# Patient Record
Sex: FEMALE | NOT HISPANIC OR LATINO | Employment: FULL TIME | URBAN - METROPOLITAN AREA
[De-identification: names, ages, dates, MRNs, and addresses within clinical notes are randomized per-mention and may not be internally consistent; named-entity substitution may affect disease eponyms.]

---

## 2017-02-10 ENCOUNTER — HOSPITAL ENCOUNTER (EMERGENCY)
Facility: HOSPITAL | Age: 37
Discharge: HOME | End: 2017-02-10
Attending: EMERGENCY MEDICINE | Admitting: EMERGENCY MEDICINE

## 2017-02-10 ENCOUNTER — APPOINTMENT (OUTPATIENT)
Dept: CARDIOLOGY | Facility: HOSPITAL | Age: 37
End: 2017-02-10

## 2017-02-10 VITALS
HEIGHT: 62 IN | RESPIRATION RATE: 16 BRPM | BODY MASS INDEX: 26.68 KG/M2 | OXYGEN SATURATION: 100 % | TEMPERATURE: 98 F | HEART RATE: 75 BPM | SYSTOLIC BLOOD PRESSURE: 115 MMHG | DIASTOLIC BLOOD PRESSURE: 81 MMHG | WEIGHT: 145 LBS

## 2017-02-10 DIAGNOSIS — R55 SYNCOPE AND COLLAPSE: Primary | ICD-10-CM

## 2017-02-10 LAB
ALBUMIN SERPL-MCNC: 4.2 G/DL (ref 3.5–5.2)
ALBUMIN/GLOB SERPL: 1.6 G/DL
ALP SERPL-CCNC: 49 U/L (ref 39–117)
ALT SERPL W P-5'-P-CCNC: 18 U/L (ref 1–33)
AMPHET+METHAMPHET UR QL: NEGATIVE
ANION GAP SERPL CALCULATED.3IONS-SCNC: 13.8 MMOL/L
AST SERPL-CCNC: 18 U/L (ref 1–32)
B-HCG UR QL: NEGATIVE
BARBITURATES UR QL SCN: NEGATIVE
BASOPHILS # BLD AUTO: 0.02 10*3/MM3 (ref 0–0.2)
BASOPHILS NFR BLD AUTO: 0.2 % (ref 0–1.5)
BENZODIAZ UR QL SCN: NEGATIVE
BILIRUB SERPL-MCNC: 0.2 MG/DL (ref 0.1–1.2)
BILIRUB UR QL STRIP: NEGATIVE
BUN BLD-MCNC: 12 MG/DL (ref 6–20)
BUN/CREAT SERPL: 16.9 (ref 7–25)
CALCIUM SPEC-SCNC: 9.1 MG/DL (ref 8.6–10.5)
CANNABINOIDS SERPL QL: NEGATIVE
CHLORIDE SERPL-SCNC: 100 MMOL/L (ref 98–107)
CLARITY UR: CLEAR
CO2 SERPL-SCNC: 23.2 MMOL/L (ref 22–29)
COCAINE UR QL: NEGATIVE
COLOR UR: YELLOW
CREAT BLD-MCNC: 0.71 MG/DL (ref 0.57–1)
DEPRECATED RDW RBC AUTO: 38.8 FL (ref 37–54)
EOSINOPHIL # BLD AUTO: 0.06 10*3/MM3 (ref 0–0.7)
EOSINOPHIL NFR BLD AUTO: 0.6 % (ref 0.3–6.2)
ERYTHROCYTE [DISTWIDTH] IN BLOOD BY AUTOMATED COUNT: 11.7 % (ref 11.7–13)
ETHANOL BLD-MCNC: <10 MG/DL (ref 0–10)
ETHANOL UR QL: <0.01 %
GFR SERPL CREATININE-BSD FRML MDRD: 113 ML/MIN/1.73
GFR SERPL CREATININE-BSD FRML MDRD: 93 ML/MIN/1.73
GLOBULIN UR ELPH-MCNC: 2.7 GM/DL
GLUCOSE BLD-MCNC: 96 MG/DL (ref 65–99)
GLUCOSE BLDC GLUCOMTR-MCNC: 77 MG/DL (ref 70–130)
GLUCOSE UR STRIP-MCNC: NEGATIVE MG/DL
HCG SERPL QL: NEGATIVE
HCT VFR BLD AUTO: 38.3 % (ref 35.6–45.5)
HGB BLD-MCNC: 12.6 G/DL (ref 11.9–15.5)
HGB UR QL STRIP.AUTO: NEGATIVE
HOLD SPECIMEN: NORMAL
HOLD SPECIMEN: NORMAL
IMM GRANULOCYTES # BLD: 0.03 10*3/MM3 (ref 0–0.03)
IMM GRANULOCYTES NFR BLD: 0.3 % (ref 0–0.5)
KETONES UR QL STRIP: NEGATIVE
LEUKOCYTE ESTERASE UR QL STRIP.AUTO: NEGATIVE
LYMPHOCYTES # BLD AUTO: 2.15 10*3/MM3 (ref 0.9–4.8)
LYMPHOCYTES NFR BLD AUTO: 21.2 % (ref 19.6–45.3)
MAGNESIUM SERPL-MCNC: 1.9 MG/DL (ref 1.6–2.6)
MCH RBC QN AUTO: 29.9 PG (ref 26.9–32)
MCHC RBC AUTO-ENTMCNC: 32.9 G/DL (ref 32.4–36.3)
MCV RBC AUTO: 90.8 FL (ref 80.5–98.2)
METHADONE UR QL SCN: NEGATIVE
MONOCYTES # BLD AUTO: 0.33 10*3/MM3 (ref 0.2–1.2)
MONOCYTES NFR BLD AUTO: 3.3 % (ref 5–12)
NEUTROPHILS # BLD AUTO: 7.53 10*3/MM3 (ref 1.9–8.1)
NEUTROPHILS NFR BLD AUTO: 74.4 % (ref 42.7–76)
NITRITE UR QL STRIP: NEGATIVE
OPIATES UR QL: NEGATIVE
OXYCODONE UR QL SCN: NEGATIVE
PH UR STRIP.AUTO: 6 [PH] (ref 5–8)
PLATELET # BLD AUTO: 352 10*3/MM3 (ref 140–500)
PMV BLD AUTO: 10 FL (ref 6–12)
POTASSIUM BLD-SCNC: 4.3 MMOL/L (ref 3.5–5.2)
PROT SERPL-MCNC: 6.9 G/DL (ref 6–8.5)
PROT UR QL STRIP: NEGATIVE
RBC # BLD AUTO: 4.22 10*6/MM3 (ref 3.9–5.2)
SODIUM BLD-SCNC: 137 MMOL/L (ref 136–145)
SP GR UR STRIP: 1.01 (ref 1–1.03)
TROPONIN T SERPL-MCNC: <0.01 NG/ML (ref 0–0.03)
UROBILINOGEN UR QL STRIP: NORMAL
WBC NRBC COR # BLD: 10.12 10*3/MM3 (ref 4.5–10.7)
WHOLE BLOOD HOLD SPECIMEN: NORMAL
WHOLE BLOOD HOLD SPECIMEN: NORMAL

## 2017-02-10 PROCEDURE — 93010 ELECTROCARDIOGRAM REPORT: CPT | Performed by: INTERNAL MEDICINE

## 2017-02-10 PROCEDURE — 85025 COMPLETE CBC W/AUTO DIFF WBC: CPT | Performed by: NURSE PRACTITIONER

## 2017-02-10 PROCEDURE — 80307 DRUG TEST PRSMV CHEM ANLYZR: CPT | Performed by: NURSE PRACTITIONER

## 2017-02-10 PROCEDURE — 84703 CHORIONIC GONADOTROPIN ASSAY: CPT | Performed by: EMERGENCY MEDICINE

## 2017-02-10 PROCEDURE — 81025 URINE PREGNANCY TEST: CPT | Performed by: NURSE PRACTITIONER

## 2017-02-10 PROCEDURE — 0296T HC EXT ECG > 48HR TO 21 DAY RCRD W/CONECT INTL RCRD: CPT

## 2017-02-10 PROCEDURE — 80053 COMPREHEN METABOLIC PANEL: CPT | Performed by: NURSE PRACTITIONER

## 2017-02-10 PROCEDURE — 81003 URINALYSIS AUTO W/O SCOPE: CPT | Performed by: NURSE PRACTITIONER

## 2017-02-10 PROCEDURE — 82962 GLUCOSE BLOOD TEST: CPT

## 2017-02-10 PROCEDURE — 84484 ASSAY OF TROPONIN QUANT: CPT | Performed by: EMERGENCY MEDICINE

## 2017-02-10 PROCEDURE — 83735 ASSAY OF MAGNESIUM: CPT | Performed by: EMERGENCY MEDICINE

## 2017-02-10 PROCEDURE — 93005 ELECTROCARDIOGRAM TRACING: CPT

## 2017-02-10 PROCEDURE — 99283 EMERGENCY DEPT VISIT LOW MDM: CPT

## 2017-02-10 RX ORDER — SODIUM CHLORIDE 0.9 % (FLUSH) 0.9 %
10 SYRINGE (ML) INJECTION AS NEEDED
Status: DISCONTINUED | OUTPATIENT
Start: 2017-02-10 | End: 2017-02-10 | Stop reason: HOSPADM

## 2017-02-10 NOTE — DISCHARGE INSTRUCTIONS
Pt instructions:  Rest  Follow up with Primary Care Doctor for further management and to have your blood pressure rechecked.   Return to ER with pain, swelling, numbness/tingling, fever, chills, weakness, nausea, vomiting, diarrhea, abdominal pain, back pain, urinary concerns, chest pain, shortness of breath, dizziness, headache, worsening of symptoms or other concerns.

## 2017-02-10 NOTE — ED NOTES
"Pt states \"i just don't feel good. i fainted last night and again today.\"      Perla Agosto RN  02/10/17 0901    "

## 2017-02-10 NOTE — ED PROVIDER NOTES
Pt presents to the ED complaining of two syncopal episode, which occurred once last night and this morning. Pt states that she developed tingling in her sinuses and light headedness prior to the syncopal episode and was only unconscious for several seconds. Pt's labs are unremarkable. On exam, the pt's heart is RRR w/o murmur, lungs are CTAB, neck is supple with no adenopathy, oropharynx is clear, abd is soft, non-distended and non-tender with NABS and NVID. I agree with the plan to discharge the pt home.       EKG           EKG time:  0921  Rhythm/Rate: NSR, 80  P waves and IL: normal  QRS, axis: normal   ST and T waves: normal     Interpreted Contemporaneously by me, independently viewed  improved compared to prior on 4-3-01    I supervised care provided by the midlevel provider.    We have discussed this patient's history, physical exam, and treatment plan.   I have reviewed the note and personally saw and examined the patient and agree with the plan of care.    Documentation assistance provided by feroz Piper for Dr. Bates.  Information recorded by the feroz was done at my direction and has been verified and validated by me.       Rina Piper  02/10/17 1220       Andi Bates MD  02/10/17 [id number]

## 2017-02-10 NOTE — ED NOTES
When pt had syncopal last night, she was sitting in a chair. Prior to event, pt felt tingling in her nose so she sat down, and then  Had her episode. Pt fell out of chair and hit head. Today, was sitting in car and had the same feeling, but also was pale, diaphoretic, and nauseated.     Perla Agosto RN  02/10/17 9235

## 2017-02-10 NOTE — ED PROVIDER NOTES
EMERGENCY DEPARTMENT ENCOUNTER    CHIEF COMPLAINT  Chief Complaint: syncope   History given by: patient   History limited by: nothing   Room Number: 08/08  PMD: No Known Provider    HPI  Pt is a 37 y.o. female who presents with a syncopal episode that occurred last night while pt was at work. Pt states she first experienced tingling in her upper nose and frontal sinus area. She then began to feel lightheaded, nauseated, and diaphoretic before losing consciousness. Her coworkers state that she was unconscious for a few seconds. Pt reports that she began feeling similar sx this morning in the car; however, she did not fully lose consciousness at that time. Pt also complains of feeling mildly SOA as if she constantly needs to take a deep breath, cough, and congestion. She states that she has sinus issues and allergies. Pt is also under a great deal of stress due to her job and the recent passing of her father. Pt denies fever, earache, chest pain, decreased appetite, and any other sx at this time. Pt does not smoke, has one cup of coffee a day, and only uses EtOH occasionally.     Duration: brief, occurred yesterday at work   Timing: constant   Location: generalized   Radiation: does not radiate   Quality: new   Intensity/Severity: moderate   Progression: unchanged as pt had a similar episode this morning   Associated Symptoms: tingling, lightheadedness, nausea, diaphoresis, SOA, cough, congestion  Aggravating Factors: none specified   Alleviating Factors: none specified   Previous Episodes: yesterday and this morning   Treatment before arrival: none specified     PAST MEDICAL HISTORY  Active Ambulatory Problems     Diagnosis Date Noted   • No Active Ambulatory Problems     Resolved Ambulatory Problems     Diagnosis Date Noted   • No Resolved Ambulatory Problems     No Additional Past Medical History       PAST SURGICAL HISTORY  Past Surgical History   Procedure Laterality Date   • External ear surgery     • Breast  surgery         FAMILY HISTORY  History reviewed. No pertinent family history.    SOCIAL HISTORY  Social History     Social History   • Marital status: Unknown     Spouse name: N/A   • Number of children: N/A   • Years of education: N/A     Occupational History   • Not on file.     Social History Main Topics   • Smoking status: Never Smoker   • Smokeless tobacco: Not on file   • Alcohol use No   • Drug use: No   • Sexual activity: Defer     Other Topics Concern   • Not on file     Social History Narrative   • No narrative on file         ALLERGIES  Review of patient's allergies indicates no known allergies.    REVIEW OF SYSTEMS  Review of Systems   Constitutional: Positive for diaphoresis. Negative for fever.   HENT: Positive for congestion. Negative for sore throat.    Eyes: Negative.    Respiratory: Positive for cough and shortness of breath.    Cardiovascular: Negative for chest pain.   Gastrointestinal: Negative for abdominal pain, diarrhea and vomiting.   Genitourinary: Negative for dysuria.   Musculoskeletal: Negative for neck pain.   Skin: Negative for rash.   Allergic/Immunologic: Negative.    Neurological: Positive for syncope, light-headedness and numbness (tingling in nose/frontal sinus area). Negative for weakness and headaches.   Hematological: Negative.    Psychiatric/Behavioral: Negative.    All other systems reviewed and are negative.      PHYSICAL EXAM  ED Triage Vitals   Temp Heart Rate Resp BP SpO2   02/10/17 0902 02/10/17 0902 02/10/17 0902 02/10/17 0908 02/10/17 0902   98.2 °F (36.8 °C) 109 16 122/81 99 %      Temp src Heart Rate Source Patient Position BP Location FiO2 (%)   02/10/17 0902 -- 02/10/17 0909 -- --   Tympanic  Lying         Physical Exam   Constitutional: She is well-developed, well-nourished, and in no distress. No distress.   HENT:   Head: Normocephalic and atraumatic.   Right Ear: Tympanic membrane and ear canal normal.   Left Ear: Tympanic membrane and ear canal normal.   Nose:  Nose normal. Right sinus exhibits no maxillary sinus tenderness and no frontal sinus tenderness. Left sinus exhibits no maxillary sinus tenderness and no frontal sinus tenderness.   Mouth/Throat: Oropharynx is clear and moist and mucous membranes are normal.   Eyes: Pupils are equal, round, and reactive to light.   Neck: Normal range of motion.   Cardiovascular: Normal rate, regular rhythm and normal heart sounds.    Pulmonary/Chest: Effort normal and breath sounds normal. She has no wheezes.   Abdominal: Soft. Bowel sounds are normal. There is no tenderness.   Musculoskeletal: Normal range of motion. She exhibits no edema.   Neurological: She is alert.   Skin: Skin is warm and dry. No rash noted.   Psychiatric: Mood, memory, affect and judgment normal.   Nursing note and vitals reviewed.      LAB RESULTS  Recent Results (from the past 24 hour(s))   POC Glucose Fingerstick    Collection Time: 02/10/17  9:18 AM   Result Value Ref Range    Glucose 77 70 - 130 mg/dL   Magnesium    Collection Time: 02/10/17  9:46 AM   Result Value Ref Range    Magnesium 1.9 1.6 - 2.6 mg/dL   Troponin    Collection Time: 02/10/17  9:46 AM   Result Value Ref Range    Troponin T <0.010 0.000 - 0.030 ng/mL   hCG, Serum, Qualitative    Collection Time: 02/10/17  9:46 AM   Result Value Ref Range    HCG Qualitative Negative Indeterminate, Negative   Comprehensive Metabolic Panel    Collection Time: 02/10/17  9:46 AM   Result Value Ref Range    Glucose 96 65 - 99 mg/dL    BUN 12 6 - 20 mg/dL    Creatinine 0.71 0.57 - 1.00 mg/dL    Sodium 137 136 - 145 mmol/L    Potassium 4.3 3.5 - 5.2 mmol/L    Chloride 100 98 - 107 mmol/L    CO2 23.2 22.0 - 29.0 mmol/L    Calcium 9.1 8.6 - 10.5 mg/dL    Total Protein 6.9 6.0 - 8.5 g/dL    Albumin 4.20 3.50 - 5.20 g/dL    ALT (SGPT) 18 1 - 33 U/L    AST (SGOT) 18 1 - 32 U/L    Alkaline Phosphatase 49 39 - 117 U/L    Total Bilirubin 0.2 0.1 - 1.2 mg/dL    eGFR Non African Amer 93 >60 mL/min/1.73    eGFR  African  Amer 113 >60 mL/min/1.73    Globulin 2.7 gm/dL    A/G Ratio 1.6 g/dL    BUN/Creatinine Ratio 16.9 7.0 - 25.0    Anion Gap 13.8 mmol/L   Ethanol    Collection Time: 02/10/17  9:46 AM   Result Value Ref Range    Ethanol <10 0 - 10 mg/dL    Ethanol % <0.010 %   CBC Auto Differential    Collection Time: 02/10/17  9:46 AM   Result Value Ref Range    WBC 10.12 4.50 - 10.70 10*3/mm3    RBC 4.22 3.90 - 5.20 10*6/mm3    Hemoglobin 12.6 11.9 - 15.5 g/dL    Hematocrit 38.3 35.6 - 45.5 %    MCV 90.8 80.5 - 98.2 fL    MCH 29.9 26.9 - 32.0 pg    MCHC 32.9 32.4 - 36.3 g/dL    RDW 11.7 11.7 - 13.0 %    RDW-SD 38.8 37.0 - 54.0 fl    MPV 10.0 6.0 - 12.0 fL    Platelets 352 140 - 500 10*3/mm3    Neutrophil % 74.4 42.7 - 76.0 %    Lymphocyte % 21.2 19.6 - 45.3 %    Monocyte % 3.3 (L) 5.0 - 12.0 %    Eosinophil % 0.6 0.3 - 6.2 %    Basophil % 0.2 0.0 - 1.5 %    Immature Grans % 0.3 0.0 - 0.5 %    Neutrophils, Absolute 7.53 1.90 - 8.10 10*3/mm3    Lymphocytes, Absolute 2.15 0.90 - 4.80 10*3/mm3    Monocytes, Absolute 0.33 0.20 - 1.20 10*3/mm3    Eosinophils, Absolute 0.06 0.00 - 0.70 10*3/mm3    Basophils, Absolute 0.02 0.00 - 0.20 10*3/mm3    Immature Grans, Absolute 0.03 0.00 - 0.03 10*3/mm3   Pregnancy, Urine    Collection Time: 02/10/17  9:49 AM   Result Value Ref Range    HCG, Urine QL Negative Negative   Urinalysis With / Culture If Indicated    Collection Time: 02/10/17  9:49 AM   Result Value Ref Range    Color, UA Yellow Yellow, Straw    Appearance, UA Clear Clear    pH, UA 6.0 5.0 - 8.0    Specific Gravity, UA 1.012 1.005 - 1.030    Glucose, UA Negative Negative    Ketones, UA Negative Negative    Bilirubin, UA Negative Negative    Blood, UA Negative Negative    Protein, UA Negative Negative    Leuk Esterase, UA Negative Negative    Nitrite, UA Negative Negative    Urobilinogen, UA 0.2 E.U./dL 0.2 - 1.0 E.U./dL   Urine Drug Screen    Collection Time: 02/10/17  9:49 AM   Result Value Ref Range    Amphet/Methamphet, Screen  "Negative Negative    Barbiturates Screen, Urine Negative Negative    Benzodiazepine Screen, Urine Negative Negative    Cocaine Screen, Urine Negative Negative    Opiate Screen Negative Negative    THC, Screen, Urine Negative Negative    Methadone Screen, Urine Negative Negative    Oxycodone Screen, Urine Negative Negative       I ordered the above labs and reviewed the results.    EKG    ekg was interpreted by Dr. Bates.     PROGRESS AND CONSULTS    1133: Reviewed pt's history and workup with Dr. Bates.  After a bedside evaluation; Dr. Bates agrees with the plan of care.    1143: Rechecked pt. Pt is resting comfortably. Discussed unremarkable workup and plan for discharge with Holter monitor to observe heart rhythm. Pt understands and agrees with plan and all questions were addressed.     COURSE & MEDICAL DECISION MAKING  Pertinent Labs and Imaging studies that were ordered and reviewed are noted above.  Results were reviewed/discussed with the patient and they were also made aware of online assess.   Pt also made aware that some labs, such as cultures, will not be resulted during ER visit and follow up with PMD is necessary.     MEDICATIONS GIVEN IN ER  Medications   sodium chloride 0.9 % flush 10 mL (not administered)       Visit Vitals   • /81 (BP Location: Right arm, Patient Position: Lying)   • Pulse 75   • Temp 98 °F (36.7 °C)   • Resp 16   • Ht 62\" (157.5 cm)   • Wt 145 lb (65.8 kg)   • SpO2 100%   • BMI 26.52 kg/m2     Discussed all results and noted any abnormalities with patient.  Discussed absoute need to recheck abnormalities with PCP.    Reviewed implications of results, diagnosis, meds, responsibility to follow up, warning signs and symptoms of possible worsening, potential complications and reasons to return to ER with patient    Discussed plan for discharge, as there is no emergent indication for admission.  Pt is agreeable and understands need for follow up and repeat testing.  Pt is aware " that discharge does not mean that nothing is wrong but it indicates no emergency is present and they must continue care with PCP.  Pt is discharged with instructions to follow up with primary care doctor to have their blood pressure rechecked.       DIAGNOSIS  Final diagnoses:   Syncope and collapse       FOLLOW UP   Rossi Pérez MD  [address] [id number] [phone]    Schedule an appointment as soon as possible for a visit      RX     Medication List      Notice     No changes were made to your prescriptions during this visit.        I personally reviewed the past medical history, past surgical history, social history, family history, current medications and allergies as they appear in this chart.  The scribe's note accurately reflects the work and decisions made by me.       I personally scribed for Priscilla Smith on 2/10/2017 at 1:25 PM.  Electronically signed by Diana Huynh on 2/10/2017 at time 1:25 PM;             Diana Huynh  02/10/17 1239       Priscilla Smith, CELI  02/10/17 1327

## 2017-03-11 PROCEDURE — 0298T ZIO PATCH: CPT | Performed by: INTERNAL MEDICINE

## 2017-03-13 ENCOUNTER — OFFICE VISIT (OUTPATIENT)
Dept: CARDIOLOGY | Facility: CLINIC | Age: 37
End: 2017-03-13

## 2017-03-13 VITALS
WEIGHT: 144 LBS | SYSTOLIC BLOOD PRESSURE: 138 MMHG | BODY MASS INDEX: 26.5 KG/M2 | DIASTOLIC BLOOD PRESSURE: 70 MMHG | HEIGHT: 62 IN | HEART RATE: 89 BPM

## 2017-03-13 DIAGNOSIS — I45.5 SINUS PAUSE: ICD-10-CM

## 2017-03-13 DIAGNOSIS — Z91.09 ENVIRONMENTAL ALLERGIES: ICD-10-CM

## 2017-03-13 DIAGNOSIS — R06.02 SHORTNESS OF BREATH: ICD-10-CM

## 2017-03-13 DIAGNOSIS — R55 SYNCOPE, UNSPECIFIED SYNCOPE TYPE: Primary | ICD-10-CM

## 2017-03-13 PROCEDURE — 93000 ELECTROCARDIOGRAM COMPLETE: CPT | Performed by: NURSE PRACTITIONER

## 2017-03-13 PROCEDURE — 99203 OFFICE O/P NEW LOW 30 MIN: CPT | Performed by: NURSE PRACTITIONER

## 2017-03-13 RX ORDER — NORETHINDRONE ACETATE AND ETHINYL ESTRADIOL AND FERROUS FUMARATE 1MG-20(21)
KIT ORAL
COMMUNITY
Start: 2017-03-12 | End: 2022-04-25 | Stop reason: HOSPADM

## 2017-03-13 RX ORDER — FLUTICASONE PROPIONATE 50 MCG
SPRAY, SUSPENSION (ML) NASAL
Refills: 4 | COMMUNITY
Start: 2017-01-26 | End: 2022-04-22

## 2017-03-13 NOTE — PATIENT INSTRUCTIONS
Avoid sinus medications    Keep yourself well hydrated     Research vasovagal syncope    Syncope    Syncope is when you temporarily lose consciousness. Syncope may also be called fainting or passing out. It is caused by a sudden decrease in blood flow to the brain. Even though most causes of syncope are not dangerous, syncope can be a sign of a serious medical problem. Signs that you may be about to faint include:  · Feeling dizzy or light-headed.  · Feeling nauseous.  · Seeing all white or all black in your field of vision.  · Having cold, clammy skin.  If you fainted, get medical help right away. Call your local emergency services (911 in the [place] Do not drive yourself to the hospital.  HOME CARE INSTRUCTIONS  Pay attention to any changes in your symptoms. Take these actions to help with your condition:  · Have someone stay with you until you feel stable.  · Do not drive, use machinery, or play sports until your health care provider says it is okay.  · Keep all follow-up visits as told by your health care provider. This is important.  · If you start to feel like you might faint, lie down right away and raise (elevate) your feet above the level of your heart. Breathe deeply and steadily. Wait until all of the symptoms have passed.  · Drink enough fluid to keep your urine clear or pale yellow.  · If you are taking blood pressure or heart medicine, get up slowly and take several minutes to sit and then stand. This can reduce dizziness.  · Take over-the-counter and prescription medicines only as told by your health care provider.  SEEK IMMEDIATE MEDICAL CARE IF:  · You have a severe headache.  · You have unusual pain in your chest, abdomen, or back.  · You are bleeding from your mouth or rectum, or you have black or tarry stool.  · You have a very fast or irregular heartbeat (palpitations).  · You have pain with breathing.  · You faint once or repeatedly.  · You have a seizure.  · You are confused.  · You have  trouble walking.  · You have severe weakness.  · You have vision problems.  These symptoms may represent a serious problem that is an emergency. Do not wait to see if your symptoms will go away. Get medical help right away. Call your local emergency services (911 in the [place] Do not drive yourself to the hospital.     This information is not intended to replace advice given to you by your health care provider. Make sure you discuss any questions you have with your health care provider.     Document Released: 12/18/2006 Document Revised: 11/28/2016 Document Reviewed: 08/31/2016  [employer] Interactive Patient Education ©2016 [employer]

## 2017-03-13 NOTE — PROGRESS NOTES
Date of Office Visit: 2017  Encounter Provider: CELI Gomez  Place of Service: [facility] CARDIOLOGY  Patient Name: Ying Rankin  :1980    Chief Complaint   Patient presents with   • Loss of Consciousness   :     HPI: Ying Rankin is a 37 y.o. female who presents today for evaluation of syncope. She had two episodes of syncope two years ago while sitting. In 2017, she was at work sitting in a chair. She felt a tingling in her nose and then had lightheadedness. She then passed out and fell out of the chair.  She did not seek medical attention.  The following day she was seen in her car and had the same symptoms of tingling in her nose and lightheadedness.  She then passed out once again while sitting.  When she came to she relates that she had passed out and felt weak and diaphoretic.  She denied any chest pain, or palpitations.  She did present to the [facility] emergency department for further evaluation.    Upon review of the ED records from 2/10/2017, her blood pressure was 122/81 and heart rate 109.  Glucose, magnesium, troponin, pregnancy test, CMP, ethanol level, CBC and urinalysis were all normal.  The patient was recommended to contact our office to make an appointment and she did not do so.  She was placed on a 14 day Zio patch monitor.    The Zio patch monitor was read by Dr. Yasmani Zamora yesterday.  The underlying rhythm was sinus with an average heart rate of 91, minimum 37 and maximum 152.  There were 2 pauses noted during the tracing with the longest being 4.2 seconds at nighttime.  The patient did not report any symptoms.  She was recommended to follow-up in our office today.    The patient presents a for follow-up.  She denies any further syncopal episodes.  She does experience shortness of breath with exertion on occasion.  She denies chest pain, paroxysmal nocturnal dyspnea, orthopnea, cough, palpitations, or edema.    Past  Medical History   Diagnosis Date   • Ankle injury 8/13/2015   • Contusion of finger 8/13/2015     Overview:  2015 IMO UPDATE   • Leukocytosis 8/13/2015   • Syncope        Past Surgical History   Procedure Laterality Date   • External ear surgery     • Breast surgery         Social History     Social History   • Marital status: Single     Spouse name: N/A   • Number of children: N/A   • Years of education: N/A     Occupational History   • Not on file.     Social History Main Topics   • Smoking status: Never Smoker   • Smokeless tobacco: Not on file   • Alcohol use No   • Drug use: No   • Sexual activity: Defer     Other Topics Concern   • Not on file     Social History Narrative       Family History   Problem Relation Age of Onset   • Heart disease Paternal Aunt    • Heart disease Paternal Uncle    • Heart disease Paternal Grandmother        Review of Systems   Constitution: Negative for chills, diaphoresis, fever, malaise/fatigue, night sweats, weight gain and weight loss.   HENT: Negative for hearing loss, nosebleeds, sore throat and tinnitus.    Eyes: Negative for blurred vision, double vision, pain and visual disturbance.   Cardiovascular: Positive for dyspnea on exertion and syncope. Negative for chest pain, claudication, cyanosis, irregular heartbeat, leg swelling, near-syncope, orthopnea, palpitations and paroxysmal nocturnal dyspnea.   Respiratory: Positive for snoring. Negative for cough, hemoptysis and wheezing.    Endocrine: Negative for cold intolerance, heat intolerance and polyuria.   Hematologic/Lymphatic: Negative for bleeding problem. Does not bruise/bleed easily.   Skin: Negative for color change, dry skin, flushing and itching.   Musculoskeletal: Negative for falls, joint pain, joint swelling, muscle cramps, muscle weakness and myalgias.   Gastrointestinal: Negative for abdominal pain, constipation, heartburn, melena, nausea and vomiting.   Genitourinary: Negative for dysuria and hematuria.  "  Neurological: Negative for excessive daytime sleepiness, dizziness, light-headedness, loss of balance, numbness, paresthesias, seizures and vertigo.   Psychiatric/Behavioral: Negative for altered mental status, depression, memory loss and substance abuse. The patient is nervous/anxious. The patient does not have insomnia.    Allergic/Immunologic: Negative for environmental allergies.       No Known Allergies      Current Outpatient Prescriptions:   •  fluticasone (FLONASE) 50 MCG/ACT nasal spray, USE 2 SPRAYS IEN QD, Disp: , Rfl: 4  •  MICROGESTIN FE 1/20 1-20 MG-MCG per tablet, , Disp: , Rfl:      Objective:     Vitals:    03/13/17 1059   BP: 138/70   BP Location: Left arm   Pulse: 89   Weight: 144 lb (65.3 kg)   Height: 62\" (157.5 cm)     Body mass index is 26.34 kg/(m^2).    PHYSICAL EXAM:    Vitals Reviewed.   General Appearance: No acute distress, well developed and well nourished.   Eyes: Conjunctiva and lids: No erythema, swelling, or discharge. Sclera non-icteric.   HENT: Atraumatic, normocephalic. External eyes, ears, and nose normal. No hearing loss noted. Mucous membranes normal. Lips not cyanotic. Neck supple with no tenderness.  Respiratory: No signs of respiratory distress. Respiration rhythm and depth normal.   Clear to auscultation. No rales, crackles, rhonchi, or wheezing auscultated.   Cardiovascular:  Jugular Venous Pressure: Normal  Heart Rate and Rhythm: Normal, Heart Sounds: Normal S1 and S2. No S3 or S4 noted.  Murmurs: No murmurs noted. No rubs, thrills, or gallops.   Arterial Pulses: Carotid pulses normal. No carotid bruit noted. Posterior tibialis and dorsalis pedis pulses normal.   Lower Extremities: No edema noted.  Gastrointestinal:  Abdomen soft, non-distended, non-tender. Normal bowel sounds. No hepatomegaly.   Musculoskeletal: Normal movement of extremities  Skin and Nails: General appearance normal. No pallor, cyanosis, diaphoresis. Skin temperature normal. No clubbing of " fingernails.   Psychiatric: Patient alert and oriented to person, place, and time. Speech and behavior appropriate. Normal mood and affect.       ECG 12 Lead  Date/Time: 3/13/2017 10:03 AM  Performed by: ANNIE RAGSDALE  Authorized by: ANNIE RAGSDALE   Comparison: compared with previous ECG from 2/10/2017  Similar to previous ECG  Rhythm: sinus rhythm  Rate: normal  BPM: 89  Conduction: conduction normal  ST Segments: ST segments normal  T Waves: T waves normal  QRS axis: normal  Other: no other findings  Clinical impression: normal ECG              Assessment:       Diagnosis Plan   1. Syncope, unspecified syncope type  Adult Transthoracic Echo Complete   2. Sinus pause  Adult Transthoracic Echo Complete   3. Shortness of breath  Adult Transthoracic Echo Complete          Plan:       The patient presents a for evaluation of syncope and review of her 14 day Zio patch monitor. Dr. Dima Urena also saw the patient with me in the office.  The patient describes her previous 2 syncopal episodes and the most recent two syncopal episodes for Dr. Urena.  He also reviewed her Zio patch monitor which showed 2 episodes of pauses with the longest 4.2 seconds in duration.  These episodes occurred during sleeping hours.  Dr. Urena feels the patient had a vasovagal syncopal episode.  He has recommended that she had a 2-D echocardiogram to assess her structural heart.  If that is normal, he feels these sinus pauses were incidental finding and can be normal for her age during the sleeping hours.  The next time she experiences the prodrome symptoms, he is recommended that she medially get to the floor and lie down.  He has also asked that the patient keep herself well-hydrated and avoid sinus medications.  The patient will be establishing care with a new PCP in the near future and I have asked her to discuss treatment of her allergies and sinus infections with her PCP.    As always, it has been a pleasure to participate in  your patient's care.      Sincerely,         CELI Canchola

## 2017-03-16 ENCOUNTER — HOSPITAL ENCOUNTER (OUTPATIENT)
Dept: CARDIOLOGY | Facility: HOSPITAL | Age: 37
Discharge: HOME | End: 2017-03-16

## 2017-03-16 DIAGNOSIS — R06.02 SHORTNESS OF BREATH: ICD-10-CM

## 2017-03-16 DIAGNOSIS — I45.5 SINUS PAUSE: ICD-10-CM

## 2017-03-16 DIAGNOSIS — R55 SYNCOPE, UNSPECIFIED SYNCOPE TYPE: ICD-10-CM

## 2017-03-21 ENCOUNTER — TELEPHONE (OUTPATIENT)
Dept: CARDIOLOGY | Facility: CLINIC | Age: 37
End: 2017-03-21

## 2017-03-21 NOTE — TELEPHONE ENCOUNTER
I spoke with patient. She did not have the 2-D echocardiogram completed as previously recommended because of the financial cost.  I recommended that she follow-up with Dr. Zamora as previously scheduled on 3/30/17 and she can discuss with him at that time.  He will also further evaluate her with her recent syncopal episodes.    The patient states that she does not want to have the echocardiogram completed, nor follow up with Dr. Zamora.  She wants to call if she has any further episodes of syncope.  If she changes her mind, I've asked her to contact the office.

## 2017-03-21 NOTE — TELEPHONE ENCOUNTER
----- Message from CELI Shaver sent at 3/17/2017  1:07 PM EDT -----    Patient did not have the echocardiogram completed     ----- Message -----     From: CELI Shaver     Sent: 3/13/2017  11:51 AM       To: CELI Shaver      Follow up on echo 3/16/17

## 2022-01-05 ENCOUNTER — APPOINTMENT (OUTPATIENT)
Dept: WOMENS IMAGING | Facility: HOSPITAL | Age: 42
End: 2022-01-05

## 2022-01-05 PROCEDURE — 77063 BREAST TOMOSYNTHESIS BI: CPT | Performed by: RADIOLOGY

## 2022-01-05 PROCEDURE — 77067 SCR MAMMO BI INCL CAD: CPT | Performed by: RADIOLOGY

## 2022-04-22 ENCOUNTER — PRE-ADMISSION TESTING (OUTPATIENT)
Dept: PREADMISSION TESTING | Facility: HOSPITAL | Age: 42
End: 2022-04-22
Payer: COMMERCIAL

## 2022-04-22 VITALS
TEMPERATURE: 98.4 F | RESPIRATION RATE: 16 BRPM | BODY MASS INDEX: 29.02 KG/M2 | DIASTOLIC BLOOD PRESSURE: 82 MMHG | OXYGEN SATURATION: 99 % | WEIGHT: 157.7 LBS | HEART RATE: 92 BPM | SYSTOLIC BLOOD PRESSURE: 122 MMHG | HEIGHT: 62 IN

## 2022-04-22 LAB
ANION GAP SERPL CALCULATED.3IONS-SCNC: 9.8 MMOL/L (ref 5–15)
BASOPHILS # BLD AUTO: 0.05 10*3/MM3 (ref 0–0.2)
BASOPHILS NFR BLD AUTO: 0.7 % (ref 0–1.5)
BUN SERPL-MCNC: 11 MG/DL (ref 6–20)
BUN/CREAT SERPL: 14.3 (ref 7–25)
CALCIUM SPEC-SCNC: 9.1 MG/DL (ref 8.6–10.5)
CHLORIDE SERPL-SCNC: 104 MMOL/L (ref 98–107)
CO2 SERPL-SCNC: 23.2 MMOL/L (ref 22–29)
CREAT SERPL-MCNC: 0.77 MG/DL (ref 0.57–1)
DEPRECATED RDW RBC AUTO: 41.3 FL (ref 37–54)
EGFRCR SERPLBLD CKD-EPI 2021: 99.5 ML/MIN/1.73
EOSINOPHIL # BLD AUTO: 0.05 10*3/MM3 (ref 0–0.4)
EOSINOPHIL NFR BLD AUTO: 0.7 % (ref 0.3–6.2)
ERYTHROCYTE [DISTWIDTH] IN BLOOD BY AUTOMATED COUNT: 11.9 % (ref 12.3–15.4)
GLUCOSE SERPL-MCNC: 86 MG/DL (ref 65–99)
HCG SERPL QL: NEGATIVE
HCT VFR BLD AUTO: 39.9 % (ref 34–46.6)
HGB BLD-MCNC: 12.6 G/DL (ref 12–15.9)
LYMPHOCYTES # BLD AUTO: 2.3 10*3/MM3 (ref 0.7–3.1)
LYMPHOCYTES NFR BLD AUTO: 30.5 % (ref 19.6–45.3)
MCH RBC QN AUTO: 29.6 PG (ref 26.6–33)
MCHC RBC AUTO-ENTMCNC: 31.6 G/DL (ref 31.5–35.7)
MCV RBC AUTO: 93.7 FL (ref 79–97)
MONOCYTES # BLD AUTO: 0.36 10*3/MM3 (ref 0.1–0.9)
MONOCYTES NFR BLD AUTO: 4.8 % (ref 5–12)
NEUTROPHILS NFR BLD AUTO: 4.74 10*3/MM3 (ref 1.7–7)
NEUTROPHILS NFR BLD AUTO: 62.9 % (ref 42.7–76)
PLATELET # BLD AUTO: 389 10*3/MM3 (ref 140–450)
PMV BLD AUTO: 9.8 FL (ref 6–12)
POTASSIUM SERPL-SCNC: 4.1 MMOL/L (ref 3.5–5.2)
RBC # BLD AUTO: 4.26 10*6/MM3 (ref 3.77–5.28)
SARS-COV-2 ORF1AB RESP QL NAA+PROBE: NOT DETECTED
SODIUM SERPL-SCNC: 137 MMOL/L (ref 136–145)
WBC NRBC COR # BLD: 7.53 10*3/MM3 (ref 3.4–10.8)

## 2022-04-22 PROCEDURE — 80048 BASIC METABOLIC PNL TOTAL CA: CPT

## 2022-04-22 PROCEDURE — U0004 COV-19 TEST NON-CDC HGH THRU: HCPCS

## 2022-04-22 PROCEDURE — 36415 COLL VENOUS BLD VENIPUNCTURE: CPT

## 2022-04-22 PROCEDURE — C9803 HOPD COVID-19 SPEC COLLECT: HCPCS

## 2022-04-22 PROCEDURE — 84703 CHORIONIC GONADOTROPIN ASSAY: CPT

## 2022-04-22 PROCEDURE — 85027 COMPLETE CBC AUTOMATED: CPT

## 2022-04-22 NOTE — DISCHARGE INSTRUCTIONS
Take the following medications the morning of surgery: Microgestin    Arrival time: 830am   Cut off time: 730am      General Instructions:  Do not eat solid food after midnight the night before surgery.  You may drink clear liquids day of surgery but must stop at least one hour before your hospital arrival time.  It is beneficial for you to have a clear drink that contains carbohydrates the day of surgery.  We suggest a 12 to 20 ounce bottle of Gatorade or Powerade for non-diabetic patients or a 12 to 20 ounce bottle of G2 or Powerade Zero for diabetic patients. (Pediatric patients, are not advised to drink a 12 to 20 ounce carbohydrate drink)    Clear liquids are liquids you can see through.  Nothing red in color.     Plain water                               Sports drinks  Sodas                                   Gelatin (Jell-O)  Fruit juices without pulp such as white grape juice and apple juice  Popsicles that contain no fruit or yogurt  Tea or coffee (no cream or milk added)  Gatorade / Powerade  G2 / Powerade Zero     Patients who avoid smoking, chewing tobacco and alcohol for 4 weeks prior to surgery have a reduced risk of post-operative complications.  Quit smoking as many days before surgery as you can.  Do not smoke, use chewing tobacco or drink alcohol the day of surgery.   Bring any papers given to you in the doctor’s office.  Wear clean comfortable clothes.  Do not wear contact lenses, false eyelashes or make-up.  Bring a case for your glasses.   Remove all piercings.  Leave jewelry and any other valuables at home.  Hair extensions with metal clips must be removed prior to surgery.  The Pre-Admission Testing nurse will instruct you to bring medications if unable to obtain an accurate list in Pre-Admission Testing.    Preventing a Surgical Site Infection:  For 2 to 3 days before surgery, avoid shaving with a razor because the razor can irritate skin and make it easier to develop an infection.    Any  areas of open skin can increase the risk of a post-operative wound infection by allowing bacteria to enter and travel throughout the body.  Notify your surgeon if you have any skin wounds / rashes even if it is not near the expected surgical site.  The area will need assessed to determine if surgery should be delayed until it is healed.  The night prior to surgery shower using a fresh bar of anti-bacterial soap (such as Dial) and clean washcloth.  Sleep in a clean bed with clean clothing.  Do not allow pets to sleep with you.  Shower on the morning of surgery using a fresh bar of anti-bacterial soap (such as Dial) and clean washcloth.  Dry with a clean towel and dress in clean clothing.  Ask your surgeon if you will be receiving antibiotics prior to surgery.  Make sure you, your family, and all healthcare providers clean their hands with soap and water or an alcohol based hand  before caring for you or your wound.    Day of surgery:  Your arrival time is approximately two hours before your scheduled surgery time.  Upon arrival, a Pre-op nurse and Anesthesiologist will review your health history, obtain vital signs, and answer questions you may have.  The only belongings needed at this time will be a list of your home medications and if applicable your C-PAP/BI-PAP machine.  A Pre-op nurse will start an IV and you may receive medication in preparation for surgery, including something to help you relax.     Please be aware that surgery does come with discomfort.  We want to make every effort to control your discomfort so please discuss any uncontrolled symptoms with your nurse.   Your doctor will most likely have prescribed pain medications.      If you are going home after surgery you will receive individualized written care instructions before being discharged.  A responsible adult must drive you to and from the hospital on the day of your surgery and stay with you for 24 hours.  Discharge prescriptions can  be filled by the hospital pharmacy during regular pharmacy hours.  If you are having surgery late in the day/evening your prescription may be e-prescribed to your pharmacy.  Please verify your pharmacy hours or chose a 24 hour pharmacy to avoid not having access to your prescription because your pharmacy has closed for the day.    If you are staying overnight following surgery, you will be transported to your hospital room following the recovery period.  [facility] has all private rooms.    If you have any questions please call Pre-Admission Testing at [phone].  Deductibles and co-payments are collected on the day of service. Please be prepared to pay the required co-pay, deductible or deposit on the day of service as defined by your plan.    Patient Education for Self-Quarantine Process    Following your COVID testing, we strongly recommend that you wear a mask when you are with other people and practice social distancing.   Limit your activities to only required outings.  Wash your hands with soap and water frequently for at least 20 seconds.   Avoid touching your eyes, nose and mouth with unwashed hands.  Do not share anything - utensils, drinking glasses, food from the same bowl.   Sanitize household surfaces daily. Include all high touch areas (door handles, light switches, phones, countertops, etc.)    Call your surgeon immediately if you experience any of the following symptoms:  Sore Throat  Shortness of Breath or difficulty breathing  Cough  Chills  Body soreness or muscle pain  Headache  Fever  New loss of taste or smell  Do not arrive for your surgery ill.  Your procedure will need to be rescheduled to another time.  You will need to call your physician before the day of surgery to avoid any unnecessary exposure to hospital staff as well as other patients.

## 2022-04-25 ENCOUNTER — ANESTHESIA EVENT (OUTPATIENT)
Dept: PERIOP | Facility: HOSPITAL | Age: 42
End: 2022-04-25
Payer: COMMERCIAL

## 2022-04-25 ENCOUNTER — HOSPITAL ENCOUNTER (OUTPATIENT)
Facility: HOSPITAL | Age: 42
Setting detail: HOSPITAL OUTPATIENT SURGERY
Discharge: HOME | End: 2022-04-25
Attending: OBSTETRICS & GYNECOLOGY | Admitting: OBSTETRICS & GYNECOLOGY
Payer: COMMERCIAL

## 2022-04-25 ENCOUNTER — ANESTHESIA (OUTPATIENT)
Dept: PERIOP | Facility: HOSPITAL | Age: 42
End: 2022-04-25
Payer: COMMERCIAL

## 2022-04-25 VITALS
DIASTOLIC BLOOD PRESSURE: 78 MMHG | TEMPERATURE: 97.7 F | RESPIRATION RATE: 16 BRPM | HEART RATE: 84 BPM | OXYGEN SATURATION: 97 % | SYSTOLIC BLOOD PRESSURE: 116 MMHG

## 2022-04-25 DIAGNOSIS — Z30.2 ENCOUNTER FOR STERILIZATION: Primary | ICD-10-CM

## 2022-04-25 DIAGNOSIS — Z30.2 STERILIZATION: ICD-10-CM

## 2022-04-25 PROBLEM — Z30.9 CONTRACEPTION MANAGEMENT: Status: ACTIVE | Noted: 2022-04-25

## 2022-04-25 PROCEDURE — 25010000002 ROPIVACAINE PER 1 MG: Performed by: OBSTETRICS & GYNECOLOGY

## 2022-04-25 PROCEDURE — 88302 TISSUE EXAM BY PATHOLOGIST: CPT | Performed by: OBSTETRICS & GYNECOLOGY

## 2022-04-25 PROCEDURE — 25010000002 FENTANYL CITRATE (PF) 50 MCG/ML SOLUTION: Performed by: NURSE ANESTHETIST, CERTIFIED REGISTERED

## 2022-04-25 PROCEDURE — 25010000002 MIDAZOLAM PER 1 MG: Performed by: ANESTHESIOLOGY

## 2022-04-25 PROCEDURE — 25010000002 KETOROLAC TROMETHAMINE PER 15 MG: Performed by: NURSE ANESTHETIST, CERTIFIED REGISTERED

## 2022-04-25 PROCEDURE — 25010000002 DEXAMETHASONE PER 1 MG: Performed by: NURSE ANESTHETIST, CERTIFIED REGISTERED

## 2022-04-25 PROCEDURE — 0 LIDOCAINE 1 % SOLUTION 20 ML VIAL: Performed by: OBSTETRICS & GYNECOLOGY

## 2022-04-25 PROCEDURE — 25010000002 PROPOFOL 10 MG/ML EMULSION: Performed by: NURSE ANESTHETIST, CERTIFIED REGISTERED

## 2022-04-25 PROCEDURE — 25010000002 ONDANSETRON PER 1 MG: Performed by: NURSE ANESTHETIST, CERTIFIED REGISTERED

## 2022-04-25 PROCEDURE — 25010000002 MAGNESIUM SULFATE PER 500 MG OF MAGNESIUM: Performed by: NURSE ANESTHETIST, CERTIFIED REGISTERED

## 2022-04-25 RX ORDER — FLUMAZENIL 0.1 MG/ML
0.2 INJECTION INTRAVENOUS AS NEEDED
Status: DISCONTINUED | OUTPATIENT
Start: 2022-04-25 | End: 2022-04-25 | Stop reason: HOSPADM

## 2022-04-25 RX ORDER — PROPOFOL 10 MG/ML
VIAL (ML) INTRAVENOUS AS NEEDED
Status: DISCONTINUED | OUTPATIENT
Start: 2022-04-25 | End: 2022-04-25 | Stop reason: SURG

## 2022-04-25 RX ORDER — OXYCODONE HYDROCHLORIDE 5 MG/1
5 TABLET ORAL EVERY 6 HOURS PRN
Qty: 10 TABLET | Refills: 0 | Status: SHIPPED | OUTPATIENT
Start: 2022-04-25

## 2022-04-25 RX ORDER — HYDROCODONE BITARTRATE AND ACETAMINOPHEN 5; 325 MG/1; MG/1
1 TABLET ORAL ONCE AS NEEDED
Status: COMPLETED | OUTPATIENT
Start: 2022-04-25 | End: 2022-04-25

## 2022-04-25 RX ORDER — LIDOCAINE HYDROCHLORIDE 20 MG/ML
INJECTION, SOLUTION INFILTRATION; PERINEURAL AS NEEDED
Status: DISCONTINUED | OUTPATIENT
Start: 2022-04-25 | End: 2022-04-25 | Stop reason: SURG

## 2022-04-25 RX ORDER — HYDROMORPHONE HYDROCHLORIDE 1 MG/ML
0.5 INJECTION, SOLUTION INTRAMUSCULAR; INTRAVENOUS; SUBCUTANEOUS
Status: DISCONTINUED | OUTPATIENT
Start: 2022-04-25 | End: 2022-04-25 | Stop reason: HOSPADM

## 2022-04-25 RX ORDER — MIDAZOLAM HYDROCHLORIDE 1 MG/ML
1 INJECTION INTRAMUSCULAR; INTRAVENOUS
Status: DISCONTINUED | OUTPATIENT
Start: 2022-04-25 | End: 2022-04-25 | Stop reason: HOSPADM

## 2022-04-25 RX ORDER — ONDANSETRON 2 MG/ML
INJECTION INTRAMUSCULAR; INTRAVENOUS AS NEEDED
Status: DISCONTINUED | OUTPATIENT
Start: 2022-04-25 | End: 2022-04-25 | Stop reason: SURG

## 2022-04-25 RX ORDER — KETOROLAC TROMETHAMINE 30 MG/ML
INJECTION, SOLUTION INTRAMUSCULAR; INTRAVENOUS AS NEEDED
Status: DISCONTINUED | OUTPATIENT
Start: 2022-04-25 | End: 2022-04-25 | Stop reason: SURG

## 2022-04-25 RX ORDER — DEXAMETHASONE SODIUM PHOSPHATE 10 MG/ML
INJECTION INTRAMUSCULAR; INTRAVENOUS AS NEEDED
Status: DISCONTINUED | OUTPATIENT
Start: 2022-04-25 | End: 2022-04-25 | Stop reason: SURG

## 2022-04-25 RX ORDER — SODIUM CHLORIDE 9 MG/ML
INJECTION, SOLUTION INTRAVENOUS AS NEEDED
Status: DISCONTINUED | OUTPATIENT
Start: 2022-04-25 | End: 2022-04-25 | Stop reason: HOSPADM

## 2022-04-25 RX ORDER — IBUPROFEN 800 MG/1
800 TABLET ORAL EVERY 8 HOURS PRN
Qty: 30 TABLET | Refills: 0 | Status: SHIPPED | OUTPATIENT
Start: 2022-04-25

## 2022-04-25 RX ORDER — LIDOCAINE HYDROCHLORIDE 10 MG/ML
0.5 INJECTION, SOLUTION EPIDURAL; INFILTRATION; INTRACAUDAL; PERINEURAL ONCE AS NEEDED
Status: DISCONTINUED | OUTPATIENT
Start: 2022-04-25 | End: 2022-04-25 | Stop reason: HOSPADM

## 2022-04-25 RX ORDER — SODIUM CHLORIDE 0.9 % (FLUSH) 0.9 %
10 SYRINGE (ML) INJECTION EVERY 12 HOURS SCHEDULED
Status: DISCONTINUED | OUTPATIENT
Start: 2022-04-25 | End: 2022-04-25 | Stop reason: HOSPADM

## 2022-04-25 RX ORDER — FENTANYL CITRATE 50 UG/ML
50 INJECTION, SOLUTION INTRAMUSCULAR; INTRAVENOUS
Status: DISCONTINUED | OUTPATIENT
Start: 2022-04-25 | End: 2022-04-25 | Stop reason: HOSPADM

## 2022-04-25 RX ORDER — EPHEDRINE SULFATE 50 MG/ML
INJECTION, SOLUTION INTRAVENOUS AS NEEDED
Status: DISCONTINUED | OUTPATIENT
Start: 2022-04-25 | End: 2022-04-25 | Stop reason: SURG

## 2022-04-25 RX ORDER — FENTANYL CITRATE 50 UG/ML
INJECTION, SOLUTION INTRAMUSCULAR; INTRAVENOUS AS NEEDED
Status: DISCONTINUED | OUTPATIENT
Start: 2022-04-25 | End: 2022-04-25 | Stop reason: SURG

## 2022-04-25 RX ORDER — HYDROCODONE BITARTRATE AND ACETAMINOPHEN 7.5; 325 MG/1; MG/1
1 TABLET ORAL EVERY 4 HOURS PRN
Status: DISCONTINUED | OUTPATIENT
Start: 2022-04-25 | End: 2022-04-25 | Stop reason: HOSPADM

## 2022-04-25 RX ORDER — ROCURONIUM BROMIDE 10 MG/ML
INJECTION, SOLUTION INTRAVENOUS AS NEEDED
Status: DISCONTINUED | OUTPATIENT
Start: 2022-04-25 | End: 2022-04-25 | Stop reason: SURG

## 2022-04-25 RX ORDER — ONDANSETRON 2 MG/ML
4 INJECTION INTRAMUSCULAR; INTRAVENOUS ONCE AS NEEDED
Status: DISCONTINUED | OUTPATIENT
Start: 2022-04-25 | End: 2022-04-25 | Stop reason: HOSPADM

## 2022-04-25 RX ORDER — MAGNESIUM SULFATE HEPTAHYDRATE 500 MG/ML
INJECTION, SOLUTION INTRAMUSCULAR; INTRAVENOUS AS NEEDED
Status: DISCONTINUED | OUTPATIENT
Start: 2022-04-25 | End: 2022-04-25 | Stop reason: SURG

## 2022-04-25 RX ORDER — EPHEDRINE SULFATE 50 MG/ML
5 INJECTION, SOLUTION INTRAVENOUS ONCE AS NEEDED
Status: DISCONTINUED | OUTPATIENT
Start: 2022-04-25 | End: 2022-04-25 | Stop reason: HOSPADM

## 2022-04-25 RX ORDER — SCOLOPAMINE TRANSDERMAL SYSTEM 1 MG/1
1 PATCH, EXTENDED RELEASE TRANSDERMAL CONTINUOUS
Status: DISCONTINUED | OUTPATIENT
Start: 2022-04-25 | End: 2022-04-25 | Stop reason: HOSPADM

## 2022-04-25 RX ORDER — SODIUM CHLORIDE 0.9 % (FLUSH) 0.9 %
10 SYRINGE (ML) INJECTION AS NEEDED
Status: DISCONTINUED | OUTPATIENT
Start: 2022-04-25 | End: 2022-04-25 | Stop reason: HOSPADM

## 2022-04-25 RX ORDER — SODIUM CHLORIDE, SODIUM LACTATE, POTASSIUM CHLORIDE, CALCIUM CHLORIDE 600; 310; 30; 20 MG/100ML; MG/100ML; MG/100ML; MG/100ML
9 INJECTION, SOLUTION INTRAVENOUS CONTINUOUS PRN
Status: DISCONTINUED | OUTPATIENT
Start: 2022-04-25 | End: 2022-04-25 | Stop reason: HOSPADM

## 2022-04-25 RX ADMIN — SCOPALAMINE 1 PATCH: 1 PATCH, EXTENDED RELEASE TRANSDERMAL at 09:24

## 2022-04-25 RX ADMIN — PROPOFOL 250 MG: 10 INJECTION, EMULSION INTRAVENOUS at 10:52

## 2022-04-25 RX ADMIN — ROCURONIUM BROMIDE 40 MG: 50 INJECTION INTRAVENOUS at 10:52

## 2022-04-25 RX ADMIN — SUGAMMADEX 200 MG: 100 INJECTION, SOLUTION INTRAVENOUS at 11:28

## 2022-04-25 RX ADMIN — FENTANYL CITRATE 100 MCG: 50 INJECTION INTRAMUSCULAR; INTRAVENOUS at 10:52

## 2022-04-25 RX ADMIN — MIDAZOLAM 1 MG: 1 INJECTION INTRAMUSCULAR; INTRAVENOUS at 09:26

## 2022-04-25 RX ADMIN — EPHEDRINE SULFATE 20 MG: 50 INJECTION INTRAVENOUS at 11:14

## 2022-04-25 RX ADMIN — DEXAMETHASONE SODIUM PHOSPHATE 10 MG: 10 INJECTION INTRAMUSCULAR; INTRAVENOUS at 11:05

## 2022-04-25 RX ADMIN — PROPOFOL 50 MG: 10 INJECTION, EMULSION INTRAVENOUS at 11:27

## 2022-04-25 RX ADMIN — ONDANSETRON 4 MG: 2 INJECTION INTRAMUSCULAR; INTRAVENOUS at 11:06

## 2022-04-25 RX ADMIN — SODIUM CHLORIDE, POTASSIUM CHLORIDE, SODIUM LACTATE AND CALCIUM CHLORIDE: 600; 310; 30; 20 INJECTION, SOLUTION INTRAVENOUS at 11:33

## 2022-04-25 RX ADMIN — MAGNESIUM SULFATE HEPTAHYDRATE 300 MG: 500 INJECTION, SOLUTION INTRAMUSCULAR; INTRAVENOUS at 10:52

## 2022-04-25 RX ADMIN — LIDOCAINE HYDROCHLORIDE 100 MG: 20 INJECTION, SOLUTION INFILTRATION; PERINEURAL at 10:52

## 2022-04-25 RX ADMIN — KETOROLAC TROMETHAMINE 30 MG: 30 INJECTION, SOLUTION INTRAMUSCULAR at 11:27

## 2022-04-25 RX ADMIN — HYDROCODONE BITARTRATE AND ACETAMINOPHEN 1 TABLET: 5; 325 TABLET ORAL at 12:05

## 2022-04-25 RX ADMIN — SODIUM CHLORIDE, POTASSIUM CHLORIDE, SODIUM LACTATE AND CALCIUM CHLORIDE 9 ML/HR: 600; 310; 30; 20 INJECTION, SOLUTION INTRAVENOUS at 09:11

## 2022-04-25 NOTE — ANESTHESIA PREPROCEDURE EVALUATION
Anesthesia Evaluation     Patient summary reviewed and Nursing notes reviewed   history of anesthetic complications: PONV  NPO Solid Status: > 8 hours             Airway   Mallampati: II  TM distance: >3 FB  Neck ROM: full  no difficulty expected  Dental - normal exam     Pulmonary - negative pulmonary ROS and normal exam   Cardiovascular - negative cardio ROS and normal exam        Neuro/Psych- negative ROS  GI/Hepatic/Renal/Endo - negative ROS     Musculoskeletal (-) negative ROS    Abdominal  - normal exam   Substance History - negative use     OB/GYN negative ob/gyn ROS         Other                        Anesthesia Plan    ASA 2     general     intravenous induction     Anesthetic plan, all risks, benefits, and alternatives have been provided, discussed and informed consent has been obtained with: patient.    Plan discussed with CRNA.        CODE STATUS:

## 2022-04-25 NOTE — ANESTHESIA PROCEDURE NOTES
Airway  Urgency: elective    Date/Time: 4/25/2022 10:55 AM  Airway not difficult    General Information and Staff    Patient location during procedure: OR  Anesthesiologist: Dima Solano MD  CRNA: Alyssa Barr CRNA    Indications and Patient Condition  Indications for airway management: airway protection    Preoxygenated: yes  Mask difficulty assessment: 1 - vent by mask    Final Airway Details  Final airway type: endotracheal airway      Successful airway: ETT  Cuffed: yes   Successful intubation technique: direct laryngoscopy  Facilitating devices/methods: intubating stylet  Endotracheal tube insertion site: oral  Blade: Julien  Blade size: 2  ETT size (mm): 7.0  Cormack-Lehane Classification: grade I - full view of glottis  Placement verified by: chest auscultation and capnometry   Measured from: lips  Number of attempts at approach: 1  Assessment: lips, teeth, and gum same as pre-op and atraumatic intubation    Additional Comments  Atraumatic, MOP to cuff, BSBE, no change to dentition, secured with tape

## 2022-04-25 NOTE — ANESTHESIA POSTPROCEDURE EVALUATION
Patient: Ying CURIEL Massiel    Procedure Summary     Date: 04/25/22 Room / Location:  [facility]  [facility]    Anesthesia Start: 1045 Anesthesia Stop: 1149    Procedure: LAPAROSCOPY WITH BILATERAL SALPINGECTOMY (Bilateral Abdomen) Diagnosis:     Surgeons: Arabella Camacho MD Provider: Dima Solano MD    Anesthesia Type: general ASA Status: 2          Anesthesia Type: general    Vitals  Vitals Value Taken Time   /77 04/25/22 1216   Temp 36.5 °C (97.7 °F) 04/25/22 1146   Pulse 101 04/25/22 1228   Resp 16 04/25/22 1215   SpO2 96 % 04/25/22 1228   Vitals shown include unvalidated device data.        Post Anesthesia Care and Evaluation    Patient location during evaluation: bedside  Patient participation: complete - patient participated  Level of consciousness: awake  Pain management: adequate  Airway patency: patent  Anesthetic complications: No anesthetic complications  PONV Status: controlled  Cardiovascular status: acceptable  Respiratory status: acceptable  Hydration status: acceptable    Comments: --------------------            04/25/22               1234     --------------------   BP:       116/78     Pulse:      84       Resp:       16       Temp:                SpO2:      97%      --------------------

## 2022-04-25 NOTE — OP NOTE
Subjective     Patient Name: Ying CURIEL Massiel  :  1980  MRN:  [id number]      Date of Service:  22      Surgeon: Arabella Camacho MD       Pre-operative diagnosis(es): Undesired fertility     Post-operative diagnosis(es): Post-Op Diagnosis Codes:   same.  Fibroid  Adhesions omentum to ant abd wall   Procedure(s): Procedure(s):  LAPAROSCOPY WITH BILATERAL SALPINGECTOMY           Anesthesia: Type: General       Objective      Operative findings: Normal cervix.uterus normal size with 3 cm left subserosal, fundal fibroid. Normal tube and ovaries bilaterally. Normal ant and post culdesac. Omentum  Adherent to ant abd wall to right of midline from near umbilicus to several cm below.     Specimens removed: Order Name Source Comment Collection Info Order Time   TISSUE PATHOLOGY EXAM Fallopian Tubes, Bilateral  Collected By: Arabella Camacho MD 2022 11:34 AM     Release to patient   Immediate               Complications:none    EBL: minimal                      Tubal papers were signed. She was taken to the operating room where she was placed under general anesthesia. She was placed in dorsal lithotomy position with her arms gently tucked at her side. She was prepped and draped, and her bladder was drained. The Hulka tenaculum was attached to the cervix to provide a means to  manipulate the uterus. Attention was turned abdominally. All port sites were injected preemptively with a mixture of 1% lidocaine and 0.5% Naropin. A Veress needle was placed in the umbilical incision with abdominal pressure and pneumoperitoneum was obtained. An 11 mm Ethicon OptiView non-bladed trocar was placed under direct visualization using laparoscope. There was omentum noted to be adherent to the anterior abdominal wall that limited some visualization of the right lower quadrant. A 5 mm Ethicon non-bladed disposable trocar was then placed under direct visualization in the left lower quadrant, slightly above the suprapubic plane,  and then looking through this port the right lower quadrant anterior abdominal wall was free of any adhesions. A 5 mm trocar was placed likewise on this side as well. Visualization upward revealed that the omental adhesions were to the right of midline. The umbilical trocar did not injure this area at all. This area of adhesions extends from near the umbilicus to several centimeters down. The patient's left tube was identified and the Enseal device was used to amputate the tube from the underlying broad ligament. This tube was parked in the anterior cul-de-sac. The patient's right tube was then visualized and amputated and removed as well with the Enseal device. The tubes were removed through the umbilicus. The pedicles were hemostatic. Trocars removed without difficulty. The skin was closed with glue and 4-0 Monocryl in the umbilical incision. Sponge and needle counts were correct x 3. She was awoken from anesthesia and taken to the recovery room in good condition.                               Arabella Camacho MD  04/25/22  11:36 EDT

## 2022-04-26 LAB
BEAKER LAB AP INTRAOPERATIVE CONSULTATION: NORMAL
LAB AP CASE REPORT: NORMAL
PATH REPORT.FINAL DX SPEC: NORMAL
PATH REPORT.GROSS SPEC: NORMAL

## 2025-01-23 ENCOUNTER — OFFICE VISIT (OUTPATIENT)
Dept: INTERNAL MEDICINE | Age: 45
End: 2025-01-23
Payer: COMMERCIAL

## 2025-01-23 VITALS
DIASTOLIC BLOOD PRESSURE: 74 MMHG | WEIGHT: 149 LBS | TEMPERATURE: 97.3 F | BODY MASS INDEX: 27.42 KG/M2 | HEART RATE: 69 BPM | OXYGEN SATURATION: 99 % | HEIGHT: 62 IN | SYSTOLIC BLOOD PRESSURE: 118 MMHG

## 2025-01-23 DIAGNOSIS — Z00.00 ANNUAL PHYSICAL EXAM: ICD-10-CM

## 2025-01-23 DIAGNOSIS — L23.9 ALLERGIC CONTACT DERMATITIS, UNSPECIFIED TRIGGER: ICD-10-CM

## 2025-01-23 DIAGNOSIS — Z23 ENCOUNTER FOR IMMUNIZATION: ICD-10-CM

## 2025-01-23 DIAGNOSIS — Z76.89 ENCOUNTER TO ESTABLISH CARE: Primary | ICD-10-CM

## 2025-01-23 DIAGNOSIS — Z11.59 NEED FOR HEPATITIS C SCREENING TEST: ICD-10-CM

## 2025-01-23 PROCEDURE — 99213 OFFICE O/P EST LOW 20 MIN: CPT

## 2025-01-23 PROCEDURE — 90715 TDAP VACCINE 7 YRS/> IM: CPT

## 2025-01-23 PROCEDURE — 90471 IMMUNIZATION ADMIN: CPT

## 2025-01-23 PROCEDURE — 99386 PREV VISIT NEW AGE 40-64: CPT

## 2025-01-23 RX ORDER — TRIAMCINOLONE ACETONIDE 1 MG/G
1 OINTMENT TOPICAL 2 TIMES DAILY
Qty: 14 G | Refills: 0 | Status: SHIPPED | OUTPATIENT
Start: 2025-01-23 | End: 2025-01-30

## 2025-01-23 NOTE — PROGRESS NOTES
"=    I N T E R N A L  M E D I C I N E  Rosaline Dean, APRN      ENCOUNTER DATE:  01/23/2025    Ying CURIEL Massiel / 45 y.o. / female      CHIEF COMPLAINT     Establish Care    Followed by GYN, Dr. Arabella Camacho.  Tubal ligation.  Up to date with pap, mammogram.  Mom of two boys, 16 and 23.    Will be due for colon cancer screening in January 2026 at age 46.    Right posterior knee fossa with pruritic, erythremic rash x 1 week.  Started mid January 2025 after cruise.  No prior history of eczema.        VITALS     Visit Vitals  /74   Pulse 69   Temp 97.3 °F (36.3 °C)   Ht 157.5 cm (62\")   Wt 67.6 kg (149 lb)   SpO2 99%   BMI 27.25 kg/m²       BP Readings from Last 3 Encounters:   01/23/25 118/74   04/25/22 116/78   04/22/22 122/82     Wt Readings from Last 3 Encounters:   01/23/25 67.6 kg (149 lb)   04/22/22 71.5 kg (157 lb 11.2 oz)   03/13/17 65.3 kg (144 lb)      Body mass index is 27.25 kg/m².       MEDICATIONS     Current Outpatient Medications on File Prior to Visit   Medication Sig Dispense Refill    [DISCONTINUED] ibuprofen (ADVIL,MOTRIN) 800 MG tablet Take 1 tablet by mouth Every 8 (Eight) Hours As Needed for Mild Pain . 30 tablet 0    [DISCONTINUED] oxyCODONE (Roxicodone) 5 MG immediate release tablet Take 1 tablet by mouth Every 6 (Six) Hours As Needed for Moderate Pain  or Severe Pain . 10 tablet 0     No current facility-administered medications on file prior to visit.         HISTORY OF PRESENT ILLNESS     Ying presents for annual health maintenance visit.    General health: good  Lifestyle:  Attempting to lose weight?: No   Diet: eats moderately healthy  Exercise: exercises 3 days weekly, cardio, weight lifting  Tobacco: Never used   Alcohol: occasional/infrequent  Work: Full-time, [employer]  Reproductive health:  Sexually active?: Yes   Sexual problems?: No problems  Concern for STD?: No    Sees Gynecologist?: Yes   Delmis/Postmenopausal?: Yes   Domestic abuse concerns: No   Depression Screening:          " 2025    12:37 PM   PHQ-2/PHQ-9 Depression Screening   Little interest or pleasure in doing things Not at all   Feeling down, depressed, or hopeless Not at all   How difficult have these problems made it for you to do your work, take care of things at home, or get along with other people? Not difficult at all         PHQ-2: 0 (Not depressed)   PHQ-9: 0 (Negative screening for depression)    Patient Care Team:  Rosaline Dean APRN as PCP - General (Family Medicine)      ALLERGIES  No Known Allergies     PFSH:     The following portions of the patient's history were reviewed and updated as appropriate: Allergies / Current Medications / Past Medical History / Surgical History / Social History / Family History    PROBLEM LIST   Patient Active Problem List   Diagnosis    Contusion of finger    Leukocytosis    Ankle injury    Syncope    Sinus pause    Shortness of breath    Environmental allergies    Contraception management       PAST MEDICAL HISTORY  Past Medical History:   Diagnosis Date    Ankle injury 2015    Contraception management 2022    Contusion of finger 2015    Overview:   IMO UPDATE    Leukocytosis 2015    PONV (postoperative nausea and vomiting)     Syncope        SURGICAL HISTORY  Past Surgical History:   Procedure Laterality Date    BREAST SURGERY       SECTION      DIAGNOSTIC LAPAROSCOPY Bilateral 2022    Procedure: LAPAROSCOPY WITH BILATERAL SALPINGECTOMY;  Surgeon: Arabella Camacho MD;  Location: [facility] OR [facility];  Service: Obstetrics/Gynecology;  Laterality: Bilateral;    EXTERNAL EAR SURGERY      TUBAL ABDOMINAL LIGATION         SOCIAL HISTORY  Social History     Socioeconomic History    Marital status: Single   Tobacco Use    Smoking status: Never     Passive exposure: Never    Smokeless tobacco: Never   Vaping Use    Vaping status: Never Used   Substance and Sexual Activity    Alcohol use: Yes     Comment: social drinker    Drug use: No    Sexual activity:  Yes     Partners: Male     Birth control/protection: Tubal ligation       FAMILY HISTORY  Family History   Problem Relation Age of Onset    Hypertension Mother     Hypertension Sister     Anxiety disorder Sister     Depression Sister     Alzheimer's disease Maternal Grandmother     Heart disease Paternal Grandmother     Skin cancer Paternal Grandfather     Heart disease Paternal Aunt     Heart attack Paternal Aunt     Heart attack Paternal Uncle     Heart disease Paternal Uncle     Skin cancer Paternal Uncle     Malig Hyperthermia Neg Hx        IMMUNIZATION HISTORY  Immunization History   Administered Date(s) Administered    COVID-19 (PFIZER) Purple Cap Monovalent 09/09/2021, 09/30/2021         REVIEW OF SYSTEMS     Review of Systems   Constitutional:  Negative for chills, fever and unexpected weight change.   Respiratory:  Negative for cough, chest tightness and shortness of breath.    Cardiovascular:  Negative for chest pain, palpitations and leg swelling.   Skin:  Positive for rash.   Neurological:  Negative for dizziness, weakness, light-headedness and headaches.   Psychiatric/Behavioral:  The patient is not nervous/anxious.          PHYSICAL EXAMINATION     Physical Exam  Vitals reviewed.   Constitutional:       General: She is not in acute distress.     Appearance: Normal appearance. She is not ill-appearing, toxic-appearing or diaphoretic.   HENT:      Head: Normocephalic and atraumatic.      Right Ear: Tympanic membrane, ear canal and external ear normal. There is no impacted cerumen.      Left Ear: Tympanic membrane, ear canal and external ear normal. There is no impacted cerumen.      Nose: Nose normal. No congestion or rhinorrhea.      Mouth/Throat:      Mouth: Mucous membranes are moist.      Pharynx: Oropharynx is clear. No oropharyngeal exudate or posterior oropharyngeal erythema.   Eyes:      Extraocular Movements: Extraocular movements intact.      Conjunctiva/sclera: Conjunctivae normal.       "Pupils: Pupils are equal, round, and reactive to light.   Cardiovascular:      Rate and Rhythm: Normal rate and regular rhythm.      Heart sounds: Normal heart sounds.   Pulmonary:      Effort: Pulmonary effort is normal. No respiratory distress.      Breath sounds: Normal breath sounds.   Abdominal:      General: Bowel sounds are normal.      Palpations: Abdomen is soft.      Tenderness: There is no abdominal tenderness.   Musculoskeletal:         General: Normal range of motion.      Cervical back: Normal range of motion and neck supple.      Right lower leg: No edema.      Left lower leg: No edema.   Lymphadenopathy:      Cervical: No cervical adenopathy.   Skin:     General: Skin is warm and dry.      Findings: Rash (Right knee posterior fossa with erythemic, dry rash) present.   Neurological:      General: No focal deficit present.      Mental Status: She is alert and oriented to person, place, and time. Mental status is at baseline.   Psychiatric:         Mood and Affect: Mood normal.         Behavior: Behavior normal.         Thought Content: Thought content normal.         Judgment: Judgment normal.         REVIEWED DATA      Labs:    Lab Results   Component Value Date     04/22/2022    K 4.1 04/22/2022    CALCIUM 9.1 04/22/2022    AST 18 02/10/2017    ALT 18 02/10/2017    BUN 11 04/22/2022    CREATININE 0.77 04/22/2022    CREATININE 0.71 02/10/2017    EGFRIFNONA 93 02/10/2017    EGFRIFAFRI 113 02/10/2017       Lab Results   Component Value Date    GLUCOSE 86 04/22/2022       No results found for: \"LDL\", \"HDL\", \"TRIG\", \"CHOLHDLRATIO\"    No results found for: \"UFZR51OT\"     Lab Results   Component Value Date    WBC 7.53 04/22/2022    HGB 12.6 04/22/2022    MCV 93.7 04/22/2022     04/22/2022       Lab Results   Component Value Date    GLUCOSEU Negative 02/10/2017    BLOODU Negative 02/10/2017    NITRITEU Negative 02/10/2017    LEUKOCYTESUR Negative 02/10/2017        No results found for: " "\"HEPCVIRUSABY\"    Imaging:        Medical Tests:        ASSESSMENT & PLAN     ANNUAL WELLNESS EXAM / PHYSICAL     Diagnoses and all orders for this visit:    1. Encounter to establish care (Primary)    2. Annual physical exam  -     CBC & Differential  -     Comprehensive Metabolic Panel  -     Hemoglobin A1c  -     Lipid Panel With / Chol / HDL Ratio  -     TSH+Free T4  -     Urinalysis With Microscopic If Indicated (No Culture) - Urine, Clean Catch    3. Allergic contact dermatitis, unspecified trigger  -     triamcinolone (KENALOG) 0.1 % ointment; Apply 1 Application topically to the appropriate area as directed 2 (Two) Times a Day for 7 days.  Dispense: 14 g; Refill: 0    4. Encounter for immunization  -     Tdap Vaccine Greater Than or Equal To 8yo IM    5. Need for hepatitis C screening test  -     Hepatitis C Antibody         Summary/Discussion:     Possible contact dermatitis vs eczema.  Treat with topical steroid.  She will reach out via [employer] if no improvement.    Follow up with GYN as scheduled to update mammogram/ pap.    BMI is >= 25 and <30. (Overweight) The following options were offered after discussion;: exercise counseling/recommendations and nutrition counseling/recommendations      Next Appointment with me: Visit date not found    Return in about 1 year (around 1/23/2026) for Annual physical.      HEALTHCARE MAINTENANCE ISSUES     Cancer Screening:  Colon: Initial/Next screening at age: 46  Repeat colon cancer screening: N/A at this time  Breast: Recommended monthly self exams; annual professional exam  Mammogram: every 1 year  Cervical: 1 year  Skin: Monthly self skin examination, annual exam by health professional  Lung: Does not meet criteria for lung cancer screening.   Other:    Screening Labs & Tests:  Lab results reviewed & discussed with the patient or test orders placed today.  EKG:  CV Screening: Lipid panel  DEXA (65+ or postmenopausal with risk factors):   HEP C (If born [birth year], " or risk factors): Ordered  Other:     Immunization/Vaccinations (to be given today unless deferred by patient)  Influenza: Recommended annual influenza vaccine  Hepatitis A: Verify immunization records  Hepatitis B: Verify immunization records  Tetanus/Pertussis: Administer today  Pneumovax: Not needed at this time  Shingles: Not needed at this time  COVID: Does not plan to get the latest booster    Lifestyle Counseling:  Lifestyle Modifications: Attempt to lose weight, Continue good lifestyle choices/modifications, Increase intensity/regularity of aerobic exercise, and Reduce exposure to stress if possible  Safety Issues: Always wear seatbelt, Avoid texting while driving   Use sunscreen, regular skin examination  Recommended annual dental/vision examination.  Emotional/Stress/Sleep: Reviewed and  given when appropriate      Health Maintenance   Topic Date Due    BMI FOLLOWUP  Never done    TDAP/TD VACCINES (2 - Tdap) 08/16/2006    MAMMOGRAM  Never done    HEPATITIS C SCREENING  Never done    PAP SMEAR  Never done    COVID-19 Vaccine (3 - 2024-25 season) 01/25/2025 (Originally 9/1/2024)    INFLUENZA VACCINE  03/31/2025 (Originally 7/1/2024)    ANNUAL PHYSICAL  01/23/2026    Pneumococcal Vaccine 0-64  Aged Out

## 2025-01-23 NOTE — LETTER
[facility]  Vaccine Consent Form    Patient Name:  Ying Rankin  Patient :  1980     Vaccine(s) Ordered    Tdap Vaccine Greater Than or Equal To 6yo IM        Screening Checklist  The following questions should be completed prior to vaccination. If you answer “yes” to any question, it does not necessarily mean you should not be vaccinated. It just means we may need to clarify or ask more questions. If a question is unclear, please ask your healthcare provider to explain it.    Yes No   Any fever or moderate to severe illness today (mild illness and/or antibiotic treatment are not contraindications)?     Do you have a history of a serious reaction to any previous vaccinations, such as anaphylaxis, encephalopathy within 7 days, Guillain-[place] syndrome within 6 weeks, seizure?     Have you received any live vaccine(s) (e.g MMR, DASIA) or any other vaccines in the last month (to ensure duplicate doses aren't given)?     Do you have an anaphylactic allergy to latex (DTaP, DTaP-IPV, Hep A, Hep B, MenB, RV, Td, Tdap), baker’s yeast (Hep B, HPV), polysorbates (RSV, nirsevimab, PCV 20, Rotavirrus, Tdap, Shingrix), or gelatin (DASIA, MMR)?     Do you have an anaphylactic allergy to neomycin (Rabies, DASIA, MMR, IPV, Hep A), polymyxin B (IPV), or streptomycin (IPV)?      Any cancer, leukemia, AIDS, or other immune system disorder? (DASIA, MMR, RV)     Do you have a parent, brother, or sister with an immune system problem (if immune competence of vaccine recipient clinically verified, can proceed)? (MMR, DASIA)     Any recent steroid treatments for >2 weeks, chemotherapy, or radiation treatment? (DASIA, MMR)     Have you received antibody-containing blood transfusions or IVIG in the past 11 months (recommended interval is dependent on product)? (MMR, DASIA)     Have you taken antiviral drugs (acyclovir, famciclovir, valacyclovir for DASIA) in the last 24 or 48 hours, respectively?      Are you pregnant or planning to become  "pregnant within 1 month? (DASIA, MMR, HPV, IPV, MenB, Abrexvy; For Hep B- refer to Engerix-B; For RSV - Abrysvo is indicated for 32-36 weeks of pregnancy from September to January)     For infants, have you ever been told your child has had intussusception or a medical emergency involving obstruction of the intestine (Rotavirus)? If not for an infant, can skip this question.         *Ordering Physicians/APC should be consulted if \"yes\" is checked by the patient or guardian above.  I have received, read, and understand the Vaccine Information Statement (VIS) for each vaccine ordered.  I have considered my or my child's health status as well as the health status of my close contacts.  I have taken the opportunity to discuss my vaccine questions with my or my child's health care provider.   I have requested that the ordered vaccine(s) be given to me or my child.  I understand the benefits and risks of the vaccines.  I understand that I should remain in the clinic for 15 minutes after receiving the vaccine(s).  _________________________________________________________  Signature of Patient or Parent/Legal Guardian ____________________  Date     "

## 2025-01-24 LAB
ALBUMIN SERPL-MCNC: 4.6 G/DL (ref 3.9–4.9)
ALP SERPL-CCNC: 73 IU/L (ref 44–121)
ALT SERPL-CCNC: 16 IU/L (ref 0–32)
APPEARANCE UR: CLEAR
AST SERPL-CCNC: 17 IU/L (ref 0–40)
BASOPHILS # BLD AUTO: 0.1 X10E3/UL (ref 0–0.2)
BASOPHILS NFR BLD AUTO: 1 %
BILIRUB SERPL-MCNC: <0.2 MG/DL (ref 0–1.2)
BILIRUB UR QL STRIP: NEGATIVE
BUN SERPL-MCNC: 9 MG/DL (ref 6–24)
BUN/CREAT SERPL: 13 (ref 9–23)
CALCIUM SERPL-MCNC: 9.5 MG/DL (ref 8.7–10.2)
CHLORIDE SERPL-SCNC: 101 MMOL/L (ref 96–106)
CHOLEST SERPL-MCNC: 183 MG/DL (ref 100–199)
CHOLEST/HDLC SERPL: 3.1 RATIO (ref 0–4.4)
CO2 SERPL-SCNC: 21 MMOL/L (ref 20–29)
COLOR UR: YELLOW
CREAT SERPL-MCNC: 0.71 MG/DL (ref 0.57–1)
EGFRCR SERPLBLD CKD-EPI 2021: 107 ML/MIN/1.73
EOSINOPHIL # BLD AUTO: 0.1 X10E3/UL (ref 0–0.4)
EOSINOPHIL NFR BLD AUTO: 1 %
ERYTHROCYTE [DISTWIDTH] IN BLOOD BY AUTOMATED COUNT: 11.6 % (ref 11.7–15.4)
GLOBULIN SER CALC-MCNC: 2.3 G/DL (ref 1.5–4.5)
GLUCOSE SERPL-MCNC: 87 MG/DL (ref 70–99)
GLUCOSE UR QL STRIP: NEGATIVE
HBA1C MFR BLD: 5.2 % (ref 4.8–5.6)
HCT VFR BLD AUTO: 40.1 % (ref 34–46.6)
HCV IGG SERPL QL IA: NON REACTIVE
HDLC SERPL-MCNC: 59 MG/DL
HGB BLD-MCNC: 13.1 G/DL (ref 11.1–15.9)
HGB UR QL STRIP: NEGATIVE
IMM GRANULOCYTES # BLD AUTO: 0 X10E3/UL (ref 0–0.1)
IMM GRANULOCYTES NFR BLD AUTO: 0 %
KETONES UR QL STRIP: NEGATIVE
LDLC SERPL CALC-MCNC: 107 MG/DL (ref 0–99)
LEUKOCYTE ESTERASE UR QL STRIP: NEGATIVE
LYMPHOCYTES # BLD AUTO: 2.8 X10E3/UL (ref 0.7–3.1)
LYMPHOCYTES NFR BLD AUTO: 30 %
MCH RBC QN AUTO: 30.2 PG (ref 26.6–33)
MCHC RBC AUTO-ENTMCNC: 32.7 G/DL (ref 31.5–35.7)
MCV RBC AUTO: 92 FL (ref 79–97)
MICRO URNS: NORMAL
MONOCYTES # BLD AUTO: 0.4 X10E3/UL (ref 0.1–0.9)
MONOCYTES NFR BLD AUTO: 5 %
NEUTROPHILS # BLD AUTO: 6 X10E3/UL (ref 1.4–7)
NEUTROPHILS NFR BLD AUTO: 63 %
NITRITE UR QL STRIP: NEGATIVE
PH UR STRIP: 7 [PH] (ref 5–7.5)
PLATELET # BLD AUTO: 394 X10E3/UL (ref 150–450)
POTASSIUM SERPL-SCNC: 4.2 MMOL/L (ref 3.5–5.2)
PROT SERPL-MCNC: 6.9 G/DL (ref 6–8.5)
PROT UR QL STRIP: NEGATIVE
RBC # BLD AUTO: 4.34 X10E6/UL (ref 3.77–5.28)
SODIUM SERPL-SCNC: 139 MMOL/L (ref 134–144)
SP GR UR STRIP: 1.01 (ref 1–1.03)
T4 FREE SERPL-MCNC: 1.33 NG/DL (ref 0.82–1.77)
TRIGL SERPL-MCNC: 94 MG/DL (ref 0–149)
TSH SERPL DL<=0.005 MIU/L-ACNC: 1.21 UIU/ML (ref 0.45–4.5)
UROBILINOGEN UR STRIP-MCNC: 0.2 MG/DL (ref 0.2–1)
VLDLC SERPL CALC-MCNC: 17 MG/DL (ref 5–40)
WBC # BLD AUTO: 9.4 X10E3/UL (ref 3.4–10.8)

## 2025-03-18 ENCOUNTER — OFFICE VISIT (OUTPATIENT)
Dept: INTERNAL MEDICINE | Age: 45
End: 2025-03-18
Payer: COMMERCIAL

## 2025-03-18 VITALS
DIASTOLIC BLOOD PRESSURE: 86 MMHG | BODY MASS INDEX: 27.79 KG/M2 | WEIGHT: 151 LBS | TEMPERATURE: 98.4 F | HEART RATE: 82 BPM | HEIGHT: 62 IN | OXYGEN SATURATION: 99 % | SYSTOLIC BLOOD PRESSURE: 112 MMHG

## 2025-03-18 DIAGNOSIS — R91.1 LUNG NODULE SEEN ON IMAGING STUDY: ICD-10-CM

## 2025-03-18 DIAGNOSIS — R56.9 SEIZURE-LIKE ACTIVITY: Primary | ICD-10-CM

## 2025-03-18 DIAGNOSIS — Z84.89 FAMILY HISTORY OF SEIZURES: ICD-10-CM

## 2025-03-18 NOTE — PROGRESS NOTES
"    I N T E R N A L  M E D I C I N E  Rosaline Dean, APRN    ENCOUNTER DATE:  03/18/2025    Ying CURIEL Massiel / 45 y.o. / female      CHIEF COMPLAINT / REASON FOR OFFICE VISIT     Seizures (PT states 3/13/25 she had a seizure at work. Pt complains of some sinus headaches.)      ASSESSMENT & PLAN     Diagnoses and all orders for this visit:    1. Seizure-like activity (Primary)  -     Ambulatory Referral to Neurology  -     Ambulatory Referral to Cardiology  -     MRI Brain With & Without Contrast; Future    2. Family history of seizures  -     Ambulatory Referral to Neurology  -     Ambulatory Referral to Cardiology  -     MRI Brain With & Without Contrast; Future    3. Lung nodule seen on imaging study  -     CT Chest Without Contrast; Future         SUMMARY/DISCUSSION  No recurrent seizure activity since ER visit on March 13, 2025.  No evidence of recent infection.  She is agreeable to be seen by [facility].  Referral placed.  Recommend EEG evaluation, updating brain MRI.  Aware of importance of seizure precautions - not driving for 90 days/ being cleared by neurology.  Aware to visit ER for any recurrent symptoms.  Recommend undergoing cardiac evaluation/ completing ECHO as previously ordered by cardiology office.  She is agreeable for referral.  Repeat CT Chest in 6 months for monitoring of lung nodule - she is agreeable.      40 minutes spent reviewing chart, prior test results, meeting with patient and documentation.      Next Appointment with me: 5/5/2025    Return in about 6 weeks (around 4/29/2025) for Recheck.      VITAL SIGNS     Visit Vitals  /86   Pulse 82   Temp 98.4 °F (36.9 °C)   Ht 157.5 cm (62\")   Wt 68.5 kg (151 lb)   SpO2 99%   BMI 27.62 kg/m²             Wt Readings from Last 3 Encounters:   03/18/25 68.5 kg (151 lb)   01/23/25 67.6 kg (149 lb)   04/22/22 71.5 kg (157 lb 11.2 oz)     Body mass index is 27.62 kg/m².        MEDICATIONS AT THE TIME OF OFFICE VISIT     No current " outpatient medications on file prior to visit.     No current facility-administered medications on file prior to visit.        HISTORY OF PRESENT ILLNESS     Seen at [facility] on March 13, 2025 for witnessed seizure like activity while at work.  Described as syncopal episode accompanied by generalized shaking of limbs, loss of bladder, biting of tongue.  Lasted about 5 minutes with post ictal state.  She is avoiding driving.  No prior history of seizures but did see cardiology in 2017 after syncopal episode.  Older and younger sisters with seizures as well - unclear what type.  She denies any recent infection symptoms, headaches, new medications, illicit substance use.  CT Head unremarkable.  CT Angiogram Chest with 6 mm partially calcified left upper lobe nodule, recommend monitoring in 6-12 months.  No evidence for pulmonary emboli.  Feels completely well at today's visit.    Scheduled to establish with [place] neurology, CELI Araya, in July 21, 2025.      Patient Care Team:  Rosaline Dean APRN as PCP - General (Family Medicine)    REVIEW OF SYSTEMS     Review of Systems   Constitutional:  Negative for chills, fever and unexpected weight change.   Respiratory:  Negative for cough, chest tightness and shortness of breath.    Cardiovascular:  Negative for chest pain, palpitations and leg swelling.   Neurological:  Negative for dizziness, weakness, light-headedness and headaches.   Psychiatric/Behavioral:  The patient is not nervous/anxious.           PHYSICAL EXAMINATION     Physical Exam  Vitals reviewed.   Constitutional:       General: She is not in acute distress.     Appearance: Normal appearance. She is not ill-appearing, toxic-appearing or diaphoretic.   HENT:      Head: Normocephalic and atraumatic.   Eyes:      Extraocular Movements: Extraocular movements intact.      Pupils: Pupils are equal, round, and reactive to light.   Cardiovascular:      Rate and Rhythm: Normal rate and regular  rhythm.      Heart sounds: Normal heart sounds.   Pulmonary:      Effort: Pulmonary effort is normal.      Breath sounds: Normal breath sounds.   Musculoskeletal:      Right lower leg: No edema.      Left lower leg: No edema.   Skin:     General: Skin is warm and dry.   Neurological:      Mental Status: She is alert and oriented to person, place, and time. Mental status is at baseline.   Psychiatric:         Mood and Affect: Mood normal.         Behavior: Behavior normal.         Thought Content: Thought content normal.         Judgment: Judgment normal.           REVIEWED DATA     Labs:           Imaging:            Medical Tests:           Summary of old records / correspondence / consultant report:           Request outside records:

## 2025-04-17 ENCOUNTER — TELEPHONE (OUTPATIENT)
Dept: INTERNAL MEDICINE | Age: 45
End: 2025-04-17
Payer: COMMERCIAL

## 2025-05-06 ENCOUNTER — OFFICE VISIT (OUTPATIENT)
Age: 45
End: 2025-05-06
Payer: COMMERCIAL

## 2025-05-06 VITALS
HEIGHT: 62 IN | DIASTOLIC BLOOD PRESSURE: 70 MMHG | SYSTOLIC BLOOD PRESSURE: 110 MMHG | WEIGHT: 148 LBS | HEART RATE: 89 BPM | BODY MASS INDEX: 27.23 KG/M2

## 2025-05-06 DIAGNOSIS — R56.9 SEIZURE: Primary | ICD-10-CM

## 2025-05-06 PROCEDURE — 99204 OFFICE O/P NEW MOD 45 MIN: CPT | Performed by: INTERNAL MEDICINE

## 2025-05-06 NOTE — PROGRESS NOTES
Subjective:     Encounter Date:05/06/2025      Patient ID: Ying Rankin is a 45 y.o. female.    Chief Complaint: seizure  HPI:   This is a 45-year-old woman who presents for evaluation.  In 2017 she was seen for an episode of syncope which occurred while taking sinus medication.  She wore a monitor which showed 2  4-second pauses.  She was evaluated by Eliza Jenkins practitioner as well as Dr. Urena who felt this was likely vasovagal in origin.  An echocardiogram was recommended at that time however not completed.  She has had no further syncope since 2017.  She has not had dizziness palpitations chest pain or shortness of breath.  She has actually felt quite normal.  In March 2025 she again had sinus headache.  She took over-the-counter sinus remedies she is not sure exactly what it was.  She had essentially an identical reaction to what she had in 2017.  She had an abnormal sensation in her frontal sinus/bridge of her nose.  She felt dizzy and sat down.  Then she had an abrupt loss of consciousness she does not know for how long.  She was at work and the episode was witnessed.  She had urinary incontinence as well as convulsions.  She woke up in a postictal state with confusion and combativeness.  It took about 2 weeks for her to return to normal mental status, she felt.  Since then she has felt normally.  She was referred here for further evaluation.  She was seen in the [facility] emergency room.  Apparently vitals were stable.  EKG was unremarkable.  The following portions of the patient's history were reviewed and updated as appropriate: allergies, current medications, past family history, past medical history, past social history, past surgical history and problem list.     REVIEW OF SYSTEMS:   All systems reviewed.  Pertinent positives identified in HPI.  All other systems are negative.    Past Medical History:   Diagnosis Date    Ankle injury 08/13/2015    Contraception management  2022    Contusion of finger 2015    Overview:  2015 IMO UPDATE    Leukocytosis 2015    PONV (postoperative nausea and vomiting)     Seizures 2025    Syncope        Family History   Problem Relation Age of Onset    Hypertension Mother     Hypertension Sister     Anxiety disorder Sister     Depression Sister     Seizures Sister     Alzheimer's disease Maternal Grandmother     Heart disease Paternal Grandmother     Skin cancer Paternal Grandfather     Heart disease Paternal Aunt     Heart attack Paternal Aunt     Heart attack Paternal Uncle     Heart disease Paternal Uncle     Skin cancer Paternal Uncle     Malig Hyperthermia Neg Hx        Social History     Socioeconomic History    Marital status: Single   Tobacco Use    Smoking status: Never     Passive exposure: Never    Smokeless tobacco: Never    Tobacco comments:     Caffeine use- coffee in the morning   Vaping Use    Vaping status: Never Used   Substance and Sexual Activity    Alcohol use: Yes     Comment: social drinker    Drug use: No    Sexual activity: Yes     Partners: Male     Birth control/protection: Tubal ligation       No Known Allergies    Past Surgical History:   Procedure Laterality Date    BREAST SURGERY       SECTION      DIAGNOSTIC LAPAROSCOPY Bilateral 2022    Procedure: LAPAROSCOPY WITH BILATERAL SALPINGECTOMY;  Surgeon: Arabella Camacho MD;  [facility]: [facility] [place] [facility] [place];  Service: Obstetrics/Gynecology;  Laterality: Bilateral;    EXTERNAL EAR SURGERY      TUBAL ABDOMINAL LIGATION         Procedures       Objective:         PHYSICAL EXAM:  GEN: VSS, no distress,   Eyes: normal sclera, normal lids and lashes  HENT: moist mucus membranes,   Respiratory: CTAB, no rales or wheezes  CV: RRR, no murmurs, , +2 DP and 2+ carotid pulses b/l  GI: NABS, soft,  Nontender, nondistended  MSK: no edema, no scoliosis or kyphosis  Skin: no rash, warm, dry  Heme/Lymph: no bruising or bleeding  Psych: organized thought, normal  behavior and affect  Neuro: Cranial nerves grossly intact, Alert and Oriented x 3.         Assessment:          Diagnosis Plan   1. Seizure             Plan:         1.  Seizure activity: Loss of consciousness associated with urinary incontinence, convulsions, postictal state.  EKG was normal, labs were normal.  She had a similar episode of loss of consciousness without seizure activity in 2017. I suggested she avoid OTC sinus medications which include phenylephrine. I don't think an echo is required at this time, probably more helpful to complete a neuro evaluation first including brain imaging or EEG. We could revisit an echo if her symptoms persist or the neuro evaluation is unrevealing but her exam and EKG are normal.     Rosaline, thank you very much for referring this kind patient to me. Please call me with any questions or concerns. I will see the patient again in the office as needed.          Asha Garcia MD  05/06/25  [facility]    No outpatient encounter medications on file as of 5/6/2025.     No facility-administered encounter medications on file as of 5/6/2025.

## (undated) DEVICE — PATIENT RETURN ELECTRODE, SINGLE-USE, CONTACT QUALITY MONITORING, ADULT, WITH 9FT CORD, FOR PATIENTS WEIGING OVER 33LBS. (15KG): Brand: MEGADYNE

## (undated) DEVICE — PK LAP GYN TOWER 40

## (undated) DEVICE — GLV SURG SIGNATURE ESSENTIAL PF LTX SZ7

## (undated) DEVICE — ADHS SKIN SURG TISS VISC PREMIERPRO EXOFIN HI/VISC FAST/DRY

## (undated) DEVICE — GLV SURG BIOGEL LTX PF 7 1/2

## (undated) DEVICE — ENDOPATH PNEUMONEEDLE INSUFFLATION NEEDLES WITH LUER LOCK CONNECTORS 120MM: Brand: ENDOPATH

## (undated) DEVICE — ENDOCUT SCISSOR TIP, DISPOSABLE: Brand: RENEW